# Patient Record
Sex: FEMALE | Race: WHITE | NOT HISPANIC OR LATINO
[De-identification: names, ages, dates, MRNs, and addresses within clinical notes are randomized per-mention and may not be internally consistent; named-entity substitution may affect disease eponyms.]

---

## 2017-01-03 RX ORDER — HYDROCODONE BITARTRATE AND ACETAMINOPHEN 5; 300 MG/1; MG/1
5-300 TABLET ORAL
Qty: 30 | Refills: 0 | Status: ACTIVE | COMMUNITY
Start: 2017-01-03 | End: 1900-01-01

## 2017-01-04 ENCOUNTER — APPOINTMENT (OUTPATIENT)
Dept: ORTHOPEDIC SURGERY | Facility: AMBULATORY SURGERY CENTER | Age: 72
End: 2017-01-04

## 2017-01-04 ENCOUNTER — OUTPATIENT (OUTPATIENT)
Dept: OUTPATIENT SERVICES | Facility: HOSPITAL | Age: 72
LOS: 1 days | Discharge: HOME | End: 2017-01-04
Payer: COMMERCIAL

## 2017-01-04 PROCEDURE — 64721 CARPAL TUNNEL SURGERY: CPT | Mod: 79,LT

## 2017-01-09 DIAGNOSIS — Z79.899 OTHER LONG TERM (CURRENT) DRUG THERAPY: ICD-10-CM

## 2017-01-09 DIAGNOSIS — J44.9 CHRONIC OBSTRUCTIVE PULMONARY DISEASE, UNSPECIFIED: ICD-10-CM

## 2017-01-09 DIAGNOSIS — K58.9 IRRITABLE BOWEL SYNDROME WITHOUT DIARRHEA: ICD-10-CM

## 2017-01-09 DIAGNOSIS — Z82.49 FAMILY HISTORY OF ISCHEMIC HEART DISEASE AND OTHER DISEASES OF THE CIRCULATORY SYSTEM: ICD-10-CM

## 2017-01-09 DIAGNOSIS — Z79.890 HORMONE REPLACEMENT THERAPY: ICD-10-CM

## 2017-01-09 DIAGNOSIS — Z84.1 FAMILY HISTORY OF DISORDERS OF KIDNEY AND URETER: ICD-10-CM

## 2017-01-09 DIAGNOSIS — B00.9 HERPESVIRAL INFECTION, UNSPECIFIED: ICD-10-CM

## 2017-01-09 DIAGNOSIS — K21.9 GASTRO-ESOPHAGEAL REFLUX DISEASE WITHOUT ESOPHAGITIS: ICD-10-CM

## 2017-01-09 DIAGNOSIS — F98.8 OTHER SPECIFIED BEHAVIORAL AND EMOTIONAL DISORDERS WITH ONSET USUALLY OCCURRING IN CHILDHOOD AND ADOLESCENCE: ICD-10-CM

## 2017-01-09 DIAGNOSIS — G56.02 CARPAL TUNNEL SYNDROME, LEFT UPPER LIMB: ICD-10-CM

## 2017-01-09 DIAGNOSIS — Z80.9 FAMILY HISTORY OF MALIGNANT NEOPLASM, UNSPECIFIED: ICD-10-CM

## 2017-01-09 DIAGNOSIS — F41.9 ANXIETY DISORDER, UNSPECIFIED: ICD-10-CM

## 2017-01-09 DIAGNOSIS — J45.909 UNSPECIFIED ASTHMA, UNCOMPLICATED: ICD-10-CM

## 2017-01-09 DIAGNOSIS — G47.33 OBSTRUCTIVE SLEEP APNEA (ADULT) (PEDIATRIC): ICD-10-CM

## 2017-01-13 ENCOUNTER — APPOINTMENT (OUTPATIENT)
Dept: ORTHOPEDIC SURGERY | Facility: CLINIC | Age: 72
End: 2017-01-13

## 2017-01-13 DIAGNOSIS — G56.01 CARPAL TUNNEL SYNDROME, RIGHT UPPER LIMB: ICD-10-CM

## 2017-02-28 ENCOUNTER — APPOINTMENT (OUTPATIENT)
Dept: ORTHOPEDIC SURGERY | Facility: CLINIC | Age: 72
End: 2017-02-28

## 2017-03-01 ENCOUNTER — APPOINTMENT (OUTPATIENT)
Dept: PLASTIC SURGERY | Facility: CLINIC | Age: 72
End: 2017-03-01

## 2017-03-16 ENCOUNTER — OUTPATIENT (OUTPATIENT)
Dept: OUTPATIENT SERVICES | Facility: HOSPITAL | Age: 72
LOS: 1 days | End: 2017-03-16

## 2017-03-16 ENCOUNTER — APPOINTMENT (OUTPATIENT)
Dept: PLASTIC SURGERY | Facility: CLINIC | Age: 72
End: 2017-03-16

## 2017-03-16 VITALS
TEMPERATURE: 99.2 F | HEART RATE: 85 BPM | DIASTOLIC BLOOD PRESSURE: 73 MMHG | RESPIRATION RATE: 1 BRPM | WEIGHT: 116 LBS | BODY MASS INDEX: 20.55 KG/M2 | SYSTOLIC BLOOD PRESSURE: 128 MMHG

## 2017-03-16 DIAGNOSIS — J45.909 UNSPECIFIED ASTHMA, UNCOMPLICATED: ICD-10-CM

## 2017-03-16 DIAGNOSIS — Z01.89 ENCOUNTER FOR OTHER SPECIFIED SPECIAL EXAMINATIONS: ICD-10-CM

## 2017-04-11 ENCOUNTER — APPOINTMENT (OUTPATIENT)
Dept: ORTHOPEDIC SURGERY | Facility: CLINIC | Age: 72
End: 2017-04-11

## 2017-07-14 ENCOUNTER — APPOINTMENT (OUTPATIENT)
Dept: ORTHOPEDIC SURGERY | Facility: CLINIC | Age: 72
End: 2017-07-14

## 2017-07-14 DIAGNOSIS — G56.02 CARPAL TUNNEL SYNDROME, LEFT UPPER LIMB: ICD-10-CM

## 2018-06-25 ENCOUNTER — APPOINTMENT (OUTPATIENT)
Dept: ORTHOPEDIC SURGERY | Facility: CLINIC | Age: 73
End: 2018-06-25
Payer: MEDICARE

## 2018-06-25 DIAGNOSIS — G56.23 LESION OF ULNAR NERVE, BILATERAL UPPER LIMBS: ICD-10-CM

## 2018-06-25 PROCEDURE — 99214 OFFICE O/P EST MOD 30 MIN: CPT

## 2018-06-25 PROCEDURE — 73070 X-RAY EXAM OF ELBOW: CPT | Mod: 50

## 2018-11-06 ENCOUNTER — OUTPATIENT (OUTPATIENT)
Dept: OUTPATIENT SERVICES | Facility: HOSPITAL | Age: 73
LOS: 1 days | End: 2018-11-06
Payer: MEDICARE

## 2018-11-06 PROCEDURE — 73630 X-RAY EXAM OF FOOT: CPT | Mod: 26,LT

## 2018-11-06 PROCEDURE — 73630 X-RAY EXAM OF FOOT: CPT

## 2018-11-24 ENCOUNTER — EMERGENCY (EMERGENCY)
Facility: HOSPITAL | Age: 73
LOS: 1 days | Discharge: HOME | End: 2018-11-24
Attending: EMERGENCY MEDICINE | Admitting: EMERGENCY MEDICINE
Payer: MEDICARE

## 2018-11-24 VITALS
RESPIRATION RATE: 16 BRPM | OXYGEN SATURATION: 98 % | SYSTOLIC BLOOD PRESSURE: 123 MMHG | HEART RATE: 66 BPM | TEMPERATURE: 99 F | DIASTOLIC BLOOD PRESSURE: 59 MMHG

## 2018-11-24 VITALS
DIASTOLIC BLOOD PRESSURE: 86 MMHG | HEART RATE: 76 BPM | SYSTOLIC BLOOD PRESSURE: 145 MMHG | TEMPERATURE: 97 F | RESPIRATION RATE: 20 BRPM | OXYGEN SATURATION: 98 % | WEIGHT: 113.98 LBS

## 2018-11-24 PROCEDURE — 83735 ASSAY OF MAGNESIUM: CPT

## 2018-11-24 PROCEDURE — 36415 COLL VENOUS BLD VENIPUNCTURE: CPT

## 2018-11-24 PROCEDURE — 74178 CT ABD&PLV WO CNTR FLWD CNTR: CPT

## 2018-11-24 PROCEDURE — 81003 URINALYSIS AUTO W/O SCOPE: CPT

## 2018-11-24 PROCEDURE — 96365 THER/PROPH/DIAG IV INF INIT: CPT | Mod: XU

## 2018-11-24 PROCEDURE — 87086 URINE CULTURE/COLONY COUNT: CPT

## 2018-11-24 PROCEDURE — 74178 CT ABD&PLV WO CNTR FLWD CNTR: CPT | Mod: 26

## 2018-11-24 PROCEDURE — 82553 CREATINE MB FRACTION: CPT

## 2018-11-24 PROCEDURE — 80053 COMPREHEN METABOLIC PANEL: CPT

## 2018-11-24 PROCEDURE — 96376 TX/PRO/DX INJ SAME DRUG ADON: CPT

## 2018-11-24 PROCEDURE — 85025 COMPLETE CBC W/AUTO DIFF WBC: CPT

## 2018-11-24 PROCEDURE — 74177 CT ABD & PELVIS W/CONTRAST: CPT

## 2018-11-24 PROCEDURE — 82550 ASSAY OF CK (CPK): CPT

## 2018-11-24 PROCEDURE — 93005 ELECTROCARDIOGRAM TRACING: CPT

## 2018-11-24 PROCEDURE — 99284 EMERGENCY DEPT VISIT MOD MDM: CPT | Mod: 25

## 2018-11-24 PROCEDURE — 83690 ASSAY OF LIPASE: CPT

## 2018-11-24 PROCEDURE — 93010 ELECTROCARDIOGRAM REPORT: CPT

## 2018-11-24 PROCEDURE — 96375 TX/PRO/DX INJ NEW DRUG ADDON: CPT

## 2018-11-24 PROCEDURE — 74176 CT ABD & PELVIS W/O CONTRAST: CPT

## 2018-11-24 PROCEDURE — 99282 EMERGENCY DEPT VISIT SF MDM: CPT

## 2018-11-24 PROCEDURE — 84484 ASSAY OF TROPONIN QUANT: CPT

## 2018-11-24 RX ORDER — IOHEXOL 300 MG/ML
30 INJECTION, SOLUTION INTRAVENOUS ONCE
Qty: 0 | Refills: 0 | Status: COMPLETED | OUTPATIENT
Start: 2018-11-24 | End: 2018-11-24

## 2018-11-24 RX ORDER — CEFOTETAN DISODIUM 1 G
2 VIAL (EA) INJECTION ONCE
Qty: 0 | Refills: 0 | Status: COMPLETED | OUTPATIENT
Start: 2018-11-24 | End: 2018-11-24

## 2018-11-24 RX ORDER — ONDANSETRON 8 MG/1
1 TABLET, FILM COATED ORAL
Qty: 30 | Refills: 0 | OUTPATIENT
Start: 2018-11-24 | End: 2018-12-03

## 2018-11-24 RX ORDER — LIDOCAINE 4 G/100G
5 CREAM TOPICAL ONCE
Qty: 0 | Refills: 0 | Status: COMPLETED | OUTPATIENT
Start: 2018-11-24 | End: 2018-11-24

## 2018-11-24 RX ORDER — METOCLOPRAMIDE HCL 10 MG
10 TABLET ORAL ONCE
Qty: 0 | Refills: 0 | Status: COMPLETED | OUTPATIENT
Start: 2018-11-24 | End: 2018-11-24

## 2018-11-24 RX ORDER — METRONIDAZOLE 500 MG
1 TABLET ORAL
Qty: 21 | Refills: 0 | OUTPATIENT
Start: 2018-11-24 | End: 2018-11-30

## 2018-11-24 RX ORDER — ONDANSETRON 8 MG/1
4 TABLET, FILM COATED ORAL ONCE
Qty: 0 | Refills: 0 | Status: COMPLETED | OUTPATIENT
Start: 2018-11-24 | End: 2018-11-24

## 2018-11-24 RX ORDER — SODIUM CHLORIDE 9 MG/ML
1000 INJECTION INTRAMUSCULAR; INTRAVENOUS; SUBCUTANEOUS
Qty: 0 | Refills: 0 | Status: DISCONTINUED | OUTPATIENT
Start: 2018-11-24 | End: 2018-11-28

## 2018-11-24 RX ORDER — SODIUM CHLORIDE 9 MG/ML
1000 INJECTION INTRAMUSCULAR; INTRAVENOUS; SUBCUTANEOUS ONCE
Qty: 0 | Refills: 0 | Status: COMPLETED | OUTPATIENT
Start: 2018-11-24 | End: 2018-11-24

## 2018-11-24 RX ORDER — CEFOXITIN 1 G/1
2 INJECTION, POWDER, FOR SOLUTION INTRAVENOUS ONCE
Qty: 0 | Refills: 0 | Status: DISCONTINUED | OUTPATIENT
Start: 2018-11-24 | End: 2018-11-24

## 2018-11-24 RX ORDER — FAMOTIDINE 10 MG/ML
20 INJECTION INTRAVENOUS ONCE
Qty: 0 | Refills: 0 | Status: COMPLETED | OUTPATIENT
Start: 2018-11-24 | End: 2018-11-24

## 2018-11-24 RX ORDER — OMEPRAZOLE 10 MG/1
1 CAPSULE, DELAYED RELEASE ORAL
Qty: 30 | Refills: 0 | OUTPATIENT
Start: 2018-11-24 | End: 2018-12-23

## 2018-11-24 RX ORDER — PANTOPRAZOLE SODIUM 20 MG/1
40 TABLET, DELAYED RELEASE ORAL ONCE
Qty: 0 | Refills: 0 | Status: COMPLETED | OUTPATIENT
Start: 2018-11-24 | End: 2018-11-24

## 2018-11-24 RX ADMIN — Medication 100 GRAM(S): at 08:05

## 2018-11-24 RX ADMIN — Medication 104 MILLIGRAM(S): at 04:48

## 2018-11-24 RX ADMIN — LIDOCAINE 5 MILLILITER(S): 4 CREAM TOPICAL at 09:48

## 2018-11-24 RX ADMIN — ONDANSETRON 4 MILLIGRAM(S): 8 TABLET, FILM COATED ORAL at 03:24

## 2018-11-24 RX ADMIN — SODIUM CHLORIDE 2000 MILLILITER(S): 9 INJECTION INTRAMUSCULAR; INTRAVENOUS; SUBCUTANEOUS at 02:52

## 2018-11-24 RX ADMIN — IOHEXOL 30 MILLILITER(S): 300 INJECTION, SOLUTION INTRAVENOUS at 03:40

## 2018-11-24 RX ADMIN — ONDANSETRON 4 MILLIGRAM(S): 8 TABLET, FILM COATED ORAL at 02:45

## 2018-11-24 RX ADMIN — Medication 30 MILLILITER(S): at 09:48

## 2018-11-24 RX ADMIN — SODIUM CHLORIDE 125 MILLILITER(S): 9 INJECTION INTRAMUSCULAR; INTRAVENOUS; SUBCUTANEOUS at 08:10

## 2018-11-24 RX ADMIN — PANTOPRAZOLE SODIUM 40 MILLIGRAM(S): 20 TABLET, DELAYED RELEASE ORAL at 09:47

## 2018-11-24 RX ADMIN — Medication 2 GRAM(S): at 08:50

## 2018-11-24 RX ADMIN — FAMOTIDINE 20 MILLIGRAM(S): 10 INJECTION INTRAVENOUS at 02:45

## 2018-11-24 RX ADMIN — SODIUM CHLORIDE 2000 MILLILITER(S): 9 INJECTION INTRAMUSCULAR; INTRAVENOUS; SUBCUTANEOUS at 04:57

## 2018-11-24 NOTE — ED PROVIDER NOTE - ATTENDING CONTRIBUTION TO CARE
Pt w/ PMHx gastritis (on a "special diet"), PSHx adbominaoplasty, p/w constipated x 4 days. Pt has taken laxatives, and since this morning had nausea, bloating, and diffuse abd pain. No vomiting. + flatus. Last colonoscopy 1 year ago, polyps only. Pt w/ PMHx asthma, gastritis (on a "special diet"), PSHx abdominoplasty, p/w constipated x 4 days. Pt has taken laxatives, and since this morning had nausea, bloating, and diffuse abd pain. No vomiting. + flatus. Last colonoscopy 1 year ago, polyps only.  Constitutional: Well appearing, well nourished, awake, alert, oriented to person, place, time/situation and in no apparent distress.  ENMT: Airway patent. Normal MM  Eyes: Clear bilaterally  Cardiac: Normal rate, regular rhythm.  Heart sounds S1, S2.  No murmurs, rubs or gallops.  Respiratory: Mild scattered wheezing. Good aeration. No increased WOB, tachypnea, hypoxia, or accessory mm use. Pt speaks in full sentences.   Gastrointestinal: Abd soft, ND, NABS. + mild diffuse ttp. No guarding, rebound, or rigidity. No pulsatile abdominal masses. No organomegaly appreciated. No CVAT   Musculoskeletal: Range of motion is not limited  Neuro: Alert and oriented x 3, face symmetric and speech fluent. Strength 5/5 x 4 ext and symmetric, nml gross motor movement, nml gait. No focal deficits noted.  Skin: Skin normal color for race, warm, dry and intact. No evidence of rash.  Psych: Alert and oriented to person, place, time/situation. anxious affect. no apparent risk to self or others.  Abd pain, nausea, constipation. DDx includes constipation, SBO, other pathology. Check labs, EKG, UA, CT a/p, IVF, antiemetics. Dispo pending w/u and clinical status

## 2018-11-24 NOTE — ED PROVIDER NOTE - OBJECTIVE STATEMENT
72 yo female in the ER c/o persistent nausea , diffuse abdominal pain and bloating sensation since morning. Pt mentioned that she  was constipated and took some laxatives. Nausea and pain started after. Pt reports h/o nausea in the past, as she was diagnosed with gastritis, but her symptoms are  much worse today.   Pt also c/o chills.

## 2018-11-24 NOTE — ED PROVIDER NOTE - MEDICAL DECISION MAKING DETAILS
Patient with epigastric pain with elevated wbc. Neg ct scan for appendicitis. Pt felt better after GI cocktail and PPI. Tolerated PO food well. Will d/c and tx with oral abx therapy.. Reassessment of abdomen. No lower abd pain.

## 2018-11-24 NOTE — CONSULT NOTE ADULT - SUBJECTIVE AND OBJECTIVE BOX
HPI :    This is a 74 yo F with history of IBS and gastritis (seeing GI doctor in [facility]), denied crohn's or UC, history of chronic constipation (has been using laxatives) presented with abdominal pain since 1 day ago. The pain was generalized, more on epigastric region. Associated with nausea but no vomiting, Has some chills but no recorded fever. The pain was too severe at home and decided to come to the ER last night for further evaluation. She has strict diet restriction for her gastritis and IBS and pain started after her thanksgiving meal.   CT scan initially done at 5am, however contrast did not reach her appendix so a repeat CT was done 2 hours later. No fat stranding seen in CT and appendix was normal. However, her terminal ileum wall was thickened with +leukocytosis (14), given the nature and age, patient would benefits from IV antibiotics and observation.       Vital Signs Last 24 Hrs  T(C): 37.3 (2018 08:05), Max: 37.3 (2018 08:05)  T(F): 99.1 (2018 08:05), Max: 99.1 (2018 08:05)  HR: 73 (2018 08:05) (73 - 84)  BP: 118/75 (2018 08:05) (118/75 - 145/86)  BP(mean): --  RR: 16 (2018 08:05) (16 - 20)  SpO2: 97% (2018 08:05) (97% - 98%)  I&O's Detail      General: NAD, resting comfortably in bed  C/V: NSR  Pulm: Nonlabored breathing, no respiratory distress  Abd: soft, mildly distended over the lower abdomen, tender at RLQ and epigastric, no rebound, no guarding, no peritoneal sign  Extrem: WWP, no susy.        LABS:                        13.4   14.0  )-----------( 259      ( 2018 02:30 )             39.4         141  |  99  |  11  ----------------------------<  92  3.7   |  28  |  0.54    Ca    9.1      2018 02:30  Mg     2.1         TPro  7.4  /  Alb  4.1  /  TBili  <0.2  /  DBili  x   /  AST  21  /  ALT  18  /  AlkPhos  64  11-24      Urinalysis Basic - ( 2018 05:22 )    Color: Yellow / Appearance: Clear / S.015 / pH: x  Gluc: x / Ketone: NEGATIVE  / Bili: Negative / Urobili: 0.2 E.U./dL   Blood: x / Protein: NEGATIVE mg/dL / Nitrite: NEGATIVE   Leuk Esterase: NEGATIVE / RBC: x / WBC x   Sq Epi: x / Non Sq Epi: x / Bacteria: x        RADIOLOGY & ADDITIONAL STUDIES:  FINDINGS: Images of the lower chest demonstrate mild atelectatic changes   in the lungs. There is again a small hiatal hernia with mural thickening   of the distal esophagus suggestive of esophagitis. Bilateral breast   implants are partially visualized.    The liver is normal in appearance.   No radiopaque gallstones are seen.    The pancreas is normal in appearance. No splenic abnormalities are seen.    No adrenal abnormalities are seen.   The kidneys are normal in appearance   bilaterally.  No renal stones are seen.  No hydronephrosis is evident.    No abdominal aortic aneurysm is seen. No retroperitoneal lymphadenopathy   is seen.    Oral contrast reaches the level of the ascending colon. There is a short   but normal-appearing appendix that fills with contrast (series 3 image 69   and series 5 image 24). No periappendiceal fat stranding. There is wall   thickening of the terminal ileum which likely represents terminal   ileitis. No dilated bowel to suggest obstruction. Large amount fecal   material again seen in the colon and rectum. No ascites is evident.     Images of the pelvis demonstrate again demonstrate the uterus to be   normal in appearance with surgical changes in the adnexa bilaterally. No   filling defects are seen in the urinary bladder. There is again metallic   density material in the urethra orifice the bladder. No pelvic   lymphadenopathy is seen.    Evaluation of the osseous structures demonstrates mild degenerative   changes of the spine.      IMPRESSION:  1. There is a short but normal-appearing appendix that fills with   contrast (series 3 image 69 and series 5 image 24). No periappendiceal   fat stranding.     2. Wall thickening of the terminal ileum likely represents terminal   ileitis, infectious versus inflammatory.    3. Small hiatal hernia and circumferential mural thickening of the distal   esophagus suggesting esophagitis.    4. Large colonic stool burden suggesting fecal retention.

## 2018-11-24 NOTE — ED ADULT NURSE REASSESSMENT NOTE - NS ED NURSE REASSESS COMMENT FT1
Surgical team member at bedside for evaluation.
Patient received from radiology. IV fluid maintenance and antibiotic medication initiated. Pending radiology results. Abdominal discomfort tolerable by report. NPO status maintained and re-enforced.

## 2018-11-24 NOTE — ED ADULT NURSE NOTE - OBJECTIVE STATEMENT
72 y/o female with hx of asthma, depression, gastritis arrived to [facility] ER reporting upper abdominal pain accompanied with nausea starting yesterday. Pt verbalized that she recently started a new diet and feels she ate something that shouldn't have. Upon assessment, abdomen soft, lung fields WNL, breathing is equal and unlabored, pulses palpable, no visible injuries noted. Pt denies chest pain, headache, dizziness, blurred vision, slurred speech, vomiting, recent injury, and palpitations. EKG performed. Patient verbalized, "I have an upset stomach and I feel im gonna have diarrhea". care in progress.

## 2018-11-24 NOTE — CONSULT NOTE ADULT - ASSESSMENT
74 yo F with IBS and gastritis, presented with abdominal pain, afebrile with leukocytosis, CT findings suggestive of normal appendix with terminal ileitis also chronic constipation. Patient currently stable and pain is under control.    Recs :  Admit to medicine for IV.Antibiotics and observation  CLD and advance as tolerated   Not for any urgent surgical intervention as appendix is normal  Bowel regimen for constipation (colace, senna, dulcolax)  Team 4C will follow     Plan d/w  74 yo F with IBS and gastritis, presented with abdominal pain, afebrile with leukocytosis, CT findings suggestive of normal appendix with terminal ileitis also chronic constipation. Patient currently stable and pain is under control.    Recs :  Admit to medicine for IV.Antibiotics and observation  CLD and advance as tolerated   Not for any urgent surgical intervention as appendix is normal  Bowel regimen for constipation (colace, senna, dulcolax)  To follow up with her GI doctor in [facility] for colonoscopy    Plan d/w

## 2018-11-28 DIAGNOSIS — K52.9 NONINFECTIVE GASTROENTERITIS AND COLITIS, UNSPECIFIED: ICD-10-CM

## 2018-11-28 DIAGNOSIS — J45.909 UNSPECIFIED ASTHMA, UNCOMPLICATED: ICD-10-CM

## 2018-11-28 DIAGNOSIS — R11.2 NAUSEA WITH VOMITING, UNSPECIFIED: ICD-10-CM

## 2018-12-14 PROBLEM — J45.909 UNSPECIFIED ASTHMA, UNCOMPLICATED: Chronic | Status: ACTIVE | Noted: 2018-11-24

## 2019-01-03 ENCOUNTER — APPOINTMENT (OUTPATIENT)
Dept: ORTHOPEDIC SURGERY | Facility: CLINIC | Age: 74
End: 2019-01-03
Payer: MEDICARE

## 2019-01-03 VITALS — BODY MASS INDEX: 20.55 KG/M2 | WEIGHT: 116 LBS | HEIGHT: 63 IN | RESPIRATION RATE: 16 BRPM

## 2019-01-03 DIAGNOSIS — M77.01 MEDIAL EPICONDYLITIS, RIGHT ELBOW: ICD-10-CM

## 2019-01-03 PROCEDURE — 99214 OFFICE O/P EST MOD 30 MIN: CPT

## 2019-03-08 ENCOUNTER — OUTPATIENT (OUTPATIENT)
Dept: OUTPATIENT SERVICES | Facility: HOSPITAL | Age: 74
LOS: 1 days | Discharge: HOME | End: 2019-03-08

## 2019-04-29 ENCOUNTER — OUTPATIENT (OUTPATIENT)
Dept: OUTPATIENT SERVICES | Facility: HOSPITAL | Age: 74
LOS: 1 days | End: 2019-04-29
Payer: MEDICARE

## 2019-04-29 PROCEDURE — 73630 X-RAY EXAM OF FOOT: CPT

## 2019-04-29 PROCEDURE — 73630 X-RAY EXAM OF FOOT: CPT | Mod: 26,LT

## 2019-09-09 ENCOUNTER — OUTPATIENT (OUTPATIENT)
Dept: OUTPATIENT SERVICES | Facility: HOSPITAL | Age: 74
LOS: 1 days | End: 2019-09-09
Payer: MEDICARE

## 2019-09-09 PROCEDURE — 73630 X-RAY EXAM OF FOOT: CPT

## 2019-09-09 PROCEDURE — 73630 X-RAY EXAM OF FOOT: CPT | Mod: 26,LT

## 2020-03-30 NOTE — ED ADULT NURSE NOTE - NSFALLRSKUNASSIST_ED_ALL_ED
Pt arrives per [facility] from home where pt rolled over this a.m. about 0800 and her left shoulder popped out. Hx of dislocation to bilateral shoulders. Pt received fentanyl 170mg IV in the field.   
no

## 2022-02-28 ENCOUNTER — OUTPATIENT (OUTPATIENT)
Dept: OUTPATIENT SERVICES | Facility: HOSPITAL | Age: 77
LOS: 1 days | End: 2022-02-28
Payer: MEDICARE

## 2022-02-28 PROCEDURE — 73630 X-RAY EXAM OF FOOT: CPT | Mod: 26,LT

## 2022-02-28 PROCEDURE — 73630 X-RAY EXAM OF FOOT: CPT

## 2022-07-20 ENCOUNTER — APPOINTMENT (OUTPATIENT)
Dept: OBGYN | Facility: CLINIC | Age: 77
End: 2022-07-20

## 2024-08-13 NOTE — ED ADULT NURSE NOTE - NSIMPLEMENTINTERV_GEN_ALL_ED
Imaging Studies/Medications
Implemented All Universal Safety Interventions:  [place] to call system. Call bell, personal items and telephone within reach. Instruct patient to call for assistance. Room bathroom lighting operational. Non-slip footwear when patient is off stretcher. Physically safe environment: no spills, clutter or unnecessary equipment. Stretcher in lowest position, wheels locked, appropriate side rails in place.